# Patient Record
Sex: FEMALE | URBAN - METROPOLITAN AREA
[De-identification: names, ages, dates, MRNs, and addresses within clinical notes are randomized per-mention and may not be internally consistent; named-entity substitution may affect disease eponyms.]

---

## 2017-10-18 ENCOUNTER — APPOINTMENT (RX ONLY)
Dept: URBAN - METROPOLITAN AREA CLINIC 46 | Facility: CLINIC | Age: 47
Setting detail: DERMATOLOGY
End: 2017-10-18

## 2017-10-18 VITALS — HEIGHT: 63 IN | WEIGHT: 235 LBS

## 2017-10-18 DIAGNOSIS — L85.3 XEROSIS CUTIS: ICD-10-CM

## 2017-10-18 DIAGNOSIS — L81.0 POSTINFLAMMATORY HYPERPIGMENTATION: ICD-10-CM

## 2017-10-18 PROBLEM — L20.84 INTRINSIC (ALLERGIC) ECZEMA: Status: ACTIVE | Noted: 2017-10-18

## 2017-10-18 PROCEDURE — ? COUNSELING

## 2017-10-18 PROCEDURE — ? PRESCRIPTION

## 2017-10-18 PROCEDURE — ? TREATMENT REGIMEN

## 2017-10-18 PROCEDURE — ? OTHER

## 2017-10-18 PROCEDURE — 99202 OFFICE O/P NEW SF 15 MIN: CPT

## 2017-10-18 RX ORDER — TRETINOIN 0.5 MG/G
CREAM TOPICAL
Qty: 1 | Refills: 1 | Status: ERX | COMMUNITY
Start: 2017-10-18

## 2017-10-18 RX ORDER — MOMETASONE FUROATE 1 MG/G
CREAM TOPICAL
Qty: 1 | Refills: 2 | Status: ERX | COMMUNITY
Start: 2017-10-18

## 2017-10-18 RX ADMIN — TRETINOIN: 0.5 CREAM TOPICAL at 14:26

## 2017-10-18 RX ADMIN — MOMETASONE FUROATE: 1 CREAM TOPICAL at 14:28

## 2017-10-18 ASSESSMENT — BSA RASH: BSA RASH: 10

## 2017-10-18 NOTE — PROCEDURE: TREATMENT REGIMEN
Otc Regimen: Zyrtec or Claritin for itching daily
Plan: Discussed using AmLactin later to darker areas.
Detail Level: Zone

## 2017-10-18 NOTE — PROCEDURE: OTHER
Detail Level: Zone
Other (Free Text): Pt made aware to use RA/HQ to scar on right knee
Note Text (......Xxx Chief Complaint.): This diagnosis correlates with the